# Patient Record
Sex: FEMALE | Race: WHITE
[De-identification: names, ages, dates, MRNs, and addresses within clinical notes are randomized per-mention and may not be internally consistent; named-entity substitution may affect disease eponyms.]

---

## 2018-09-02 ENCOUNTER — HOSPITAL ENCOUNTER (EMERGENCY)
Dept: HOSPITAL 25 - UCEAST | Age: 63
Discharge: HOME | End: 2018-09-02
Payer: COMMERCIAL

## 2018-09-02 DIAGNOSIS — Z87.440: ICD-10-CM

## 2018-09-02 DIAGNOSIS — Z88.5: ICD-10-CM

## 2018-09-02 DIAGNOSIS — B96.20: ICD-10-CM

## 2018-09-02 DIAGNOSIS — Z96.653: ICD-10-CM

## 2018-09-02 DIAGNOSIS — N39.0: Primary | ICD-10-CM

## 2018-09-02 PROCEDURE — 99202 OFFICE O/P NEW SF 15 MIN: CPT

## 2018-09-02 PROCEDURE — 81003 URINALYSIS AUTO W/O SCOPE: CPT

## 2018-09-02 PROCEDURE — 87086 URINE CULTURE/COLONY COUNT: CPT

## 2018-09-02 PROCEDURE — 87186 SC STD MICRODIL/AGAR DIL: CPT

## 2018-09-02 PROCEDURE — G0463 HOSPITAL OUTPT CLINIC VISIT: HCPCS

## 2018-09-02 PROCEDURE — 87077 CULTURE AEROBIC IDENTIFY: CPT

## 2018-09-02 NOTE — UC
Complaint Female HPI





- HPI Summary


HPI Summary: 





This patient is a 63 year old female presenting to [facility] with a chief complaint 

of dysuria since 0400 today. Patient states that UTIs are not uncommon for her. 

She recently came here from [place] and is on a daily dose of prophylactic 

trimethoprim for her uti. Now, she has had dysuria again and is concerned of an 

UTI. The pain is described as burning with urination and increase in frequency 

and urgency.,Symptoms aggravated by nothing. Symptoms alleviated by nothing. 

Patient denies fever, chills, abd pain. She is a nurse and wanted to get the 

culture studies done 





- History Of Current Complaint


Chief Complaint: UCGU


Stated Complaint: URINARY ISSUE


Time Seen by Provider: 09/02/18 12:10


Hx Obtained From: Patient


Onset/Duration: Lasting Hours, Still Present


Timing: Constant


Severity Currently: None


Pain Intensity: 0


Pain Scale Used: 0-10 Numeric


Character: Burning


Aggravating Factor(s): Nothing


Alleviating Factor(s): Nothing


Associated Signs And Symptoms: Positive: Negative - fever, chills, abd pain





- Allergies/Home Medications


Allergies/Adverse Reactions: 


 Allergies











Allergy/AdvReac Type Severity Reaction Status Date / Time


 


tramadol [From Ultram] Allergy  Nausea And Verified 09/02/18 11:39





   Vomiting  











Home Medications: 


 Home Medications





Meloxicam [Mobic] 1 dose PO DAILY 09/02/18 [History Confirmed 09/02/18]


Trimethoprim TAB* 1 tab PO DAILY 09/02/18 [History Confirmed 09/02/18]











PMH/Surg Hx/FS Hx/Imm Hx


Previously Healthy: Yes


Other Endocrine History:  Negative: Diabetes


Other Cardiovascular History: Negative: Cardiac Disease


Other Respiratory History: negative


Other GI/ History: negative


Other Neurological History: negative


Other Psychological History: negative


Other Cancer History: negative





- Surgical History


Surgical History: Yes


Surgery Procedure, Year, and Place: Bilateral knee replacement





- Family History


Known Family History: Positive: Hypertension





- Social History


Occupation: Employed Full-time


Alcohol Use: None


Substance Use Type: None


Smoking Status (MU): Never Smoked Tobacco





Review of Systems


Constitutional: Negative - Fever, Chills


Skin: Negative


Eyes: Negative


ENT: Negative


Respiratory: Negative


Cardiovascular: Negative


Gastrointestinal: Negative - Abd pain


Genitourinary: Dysuria, Frequency, Urgency


Motor: Negative


Neurovascular: Negative


Musculoskeletal: Negative


Neurological: Negative


Psychological: Negative


Is Patient Immunocompromised?: No


All Other Systems Reviewed And Are Negative: Yes





Physical Exam





- Summary


Physical Exam Summary: 





Appearance: Well-Appearing, No Pain Distress, Well-Nourished


Eyes: conjunctiva clear, no discharge


ENT: Hearing grossly normal, no muffled/hoarse voice. 


Neck: Normal, Supple


Respiratory/Lung Sounds: Lungs clear, Normal breath sounds, No respiratory 

distress, No accessory muscle use


Cardiovascular: RRR, No murmur


Abdomen: Nontender, Soft, no guarding, not distended


Bowel Sounds: Present


Musculoskeletal: Normal


Neurological: Alert, muscle tone normal


Psychiatric:Normal, age appropriate behavior


Skin: Normal, Warm, Dry, Normal color


Triage Information Reviewed: Yes


Vital Signs: 


 Initial Vital Signs











Temp  96.5 F   09/02/18 11:36


 


Pulse  81   09/02/18 11:36


 


Resp  18   09/02/18 11:36


 


BP  132/62   09/02/18 11:36


 


Pulse Ox  99   09/02/18 11:36











Vital Signs Reviewed: Yes





 Complaint Female Dx





- Course


Course Of Treatment: This patient is a 63 year old female presenting to [facility] 

with a chief complaint of dysuria since 0400 today. Urinalysis Obtained. UA 

positive for leukocytes and blood.  Patient  diagnosed with UTI. Patient will 

be discharged with prescription for Macrobid and follow up with PCP. The 

patient is agreeable with this plan.





- Differential Dx/Diagnosis


Provider Diagnoses: Urinary tract infection





Discharge





- Sign-Out/Discharge


Documenting (check all that apply): Patient Departure


All imaging exams completed and their final reports reviewed: Yes - Urinalysis 

POC





- Discharge Plan


Condition: Stable


Disposition: HOME


Prescriptions: 


Nitrofurantoin Monohyd/M-Cryst [Macrobid 100 mg Capsule] 100 mg PO BID 5 Days #

10 cap


Patient Education Materials:  Urinary Tract Infection in Women (ED)


Referrals: 


No Primary Care Phys,NOPCP [Primary Care Provider] - 1 Week


Additional Instructions: 


Please start taking the medication as prescribed to the pharmacy . 


Follow up with your primary care doctor in 1 week. 


Return to Urgent care / ER if symptoms get worse. 








- Billing Disposition and Condition


Condition: STABLE


Disposition: Home





- Attestation Statements


Document Initiated by Scribe: Yes


Documenting Scribe: Ralf Jewell


Provider For Whom Scribe is Documenting (Include Credential): Chrissie Urbina MD


Scribe Attestation: 


Ralf REHMAN scribed for Chrissie Urbina MD on 09/02/18 at 1520. 


Scribe Documentation Reviewed: Yes


Provider Attestation: 


The documentation as recorded by the scribeRalf accurately reflects 

the service I personally performed and the decisions made by me, Chrissie Urbina MD

## 2018-09-04 NOTE — UC
- Progress Note


Progress Note: 





RN to call pt.  + E. Coli. . RN to advise pt to complete course of antibiotic 

as prescribed.  Seek medical attention for worse or new problems.  








Discharge





- Sign-Out/Discharge


Documenting (check all that apply): Post-Discharge Follow Up


All imaging exams completed and their final reports reviewed: Yes - Urinalysis 

POC





- Discharge Plan


Condition: Stable


Disposition: HOME


Prescriptions: 


Nitrofurantoin Monohyd/M-Cryst [Macrobid 100 mg Capsule] 100 mg PO BID 5 Days #

10 cap


Patient Education Materials:  Urinary Tract Infection in Women (ED)


Referrals: 


No Primary Care Phys,NOPCP [Primary Care Provider] - 1 Week


Additional Instructions: 


Please start taking the medication as prescribed to the pharmacy . 


Follow up with your primary care doctor in 1 week. 


Return to Urgent care / ER if symptoms get worse. 








- Billing Disposition and Condition


Condition: STABLE


Disposition: Home